# Patient Record
Sex: FEMALE | Race: ASIAN | Employment: STUDENT | URBAN - METROPOLITAN AREA
[De-identification: names, ages, dates, MRNs, and addresses within clinical notes are randomized per-mention and may not be internally consistent; named-entity substitution may affect disease eponyms.]

---
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
## 2020-07-01 ENCOUNTER — OFFICE VISIT (OUTPATIENT)
Dept: FAMILY MEDICINE CLINIC | Facility: CLINIC | Age: 20
End: 2020-07-01

## 2020-07-01 VITALS
WEIGHT: 126 LBS | RESPIRATION RATE: 16 BRPM | BODY MASS INDEX: 20.25 KG/M2 | DIASTOLIC BLOOD PRESSURE: 62 MMHG | TEMPERATURE: 99 F | HEIGHT: 66 IN | HEART RATE: 68 BPM | SYSTOLIC BLOOD PRESSURE: 94 MMHG

## 2020-07-01 DIAGNOSIS — N94.6 DYSMENORRHEA: ICD-10-CM

## 2020-07-01 DIAGNOSIS — Z00.00 LABORATORY EXAMINATION ORDERED AS PART OF A ROUTINE GENERAL MEDICAL EXAMINATION: ICD-10-CM

## 2020-07-01 DIAGNOSIS — Z00.00 ROUTINE GENERAL MEDICAL EXAMINATION AT A HEALTH CARE FACILITY: Primary | ICD-10-CM

## 2020-07-01 DIAGNOSIS — Z13.89 SCREENING FOR GENITOURINARY CONDITION: ICD-10-CM

## 2020-07-01 PROCEDURE — 99395 PREV VISIT EST AGE 18-39: CPT | Performed by: NURSE PRACTITIONER

## 2020-07-01 NOTE — H&P
HPI:   Mary Pike is a 20year old female who presents for a complete physical exam. Symptoms: denies discharge, itching, burning or dysuria, periods are regular, flow is 6-7 days, LMP: 06/13/2020. Patient is not sexually active.  No family history of breast 103 101 - 111 mmol/L    CO2 25.0 22.0 - 32.0 mmol/L    Anion Gap 8 0 - 18 mmol/L    BUN/CREA Ratio 17.0 10.0 - 20.0    Calculated Osmolality 283 275 - 295 mOsm/kg   VITAMIN D, 25-HYDROXY   Result Value Ref Range    Vitamin D, 25OH, Total 17.1 (L) 30.0 - 10 denies leg cramps  GI: denies abdominal pain, bowel movement changes, blood in stool  : denies urinary problems, vaginal discharge or discomfort, periods regular  MUSCULOSKELETAL: denies joint pain or stiffness  NEURO: denies headaches, tingling or dizzi facility  - CBC WITH DIFFERENTIAL WITH PLATELET; Future  - COMP METABOLIC PANEL (14); Future  - LIPID PANEL; Future  - TSH+FREE T4; Future    2. BMI 20.0-20.9, adult  Monitor.     3. Dysmenorrhea  Discussed can try OTC Midol or Ibuprofen 600 mg q 6hours prn

## 2020-07-06 ENCOUNTER — LAB ENCOUNTER (OUTPATIENT)
Dept: LAB | Age: 20
End: 2020-07-06
Attending: NURSE PRACTITIONER
Payer: COMMERCIAL

## 2020-07-06 DIAGNOSIS — Z00.00 LABORATORY EXAMINATION ORDERED AS PART OF A ROUTINE GENERAL MEDICAL EXAMINATION: ICD-10-CM

## 2020-07-06 DIAGNOSIS — Z13.89 SCREENING FOR GENITOURINARY CONDITION: ICD-10-CM

## 2020-07-06 DIAGNOSIS — Z00.00 ROUTINE GENERAL MEDICAL EXAMINATION AT A HEALTH CARE FACILITY: ICD-10-CM

## 2020-07-06 LAB
ALBUMIN SERPL-MCNC: 4.3 G/DL (ref 3.4–5)
ALBUMIN/GLOB SERPL: 1.1 {RATIO} (ref 1–2)
ALP LIVER SERPL-CCNC: 41 U/L (ref 52–144)
ALT SERPL-CCNC: 14 U/L (ref 13–56)
ANION GAP SERPL CALC-SCNC: 6 MMOL/L (ref 0–18)
AST SERPL-CCNC: 16 U/L (ref 15–37)
BASOPHILS # BLD AUTO: 0.14 X10(3) UL (ref 0–0.2)
BASOPHILS NFR BLD AUTO: 1.6 %
BILIRUB SERPL-MCNC: 0.9 MG/DL (ref 0.1–2)
BILIRUB UR QL STRIP.AUTO: NEGATIVE
BUN BLD-MCNC: 14 MG/DL (ref 7–18)
BUN/CREAT SERPL: 16.1 (ref 10–20)
CALCIUM BLD-MCNC: 9.3 MG/DL (ref 8.5–10.1)
CHLORIDE SERPL-SCNC: 106 MMOL/L (ref 98–112)
CHOLEST SMN-MCNC: 181 MG/DL (ref ?–200)
CLARITY UR REFRACT.AUTO: CLEAR
CO2 SERPL-SCNC: 26 MMOL/L (ref 21–32)
COLOR UR AUTO: YELLOW
CREAT BLD-MCNC: 0.87 MG/DL (ref 0.55–1.02)
DEPRECATED RDW RBC AUTO: 40.8 FL (ref 35.1–46.3)
EOSINOPHIL # BLD AUTO: 1.54 X10(3) UL (ref 0–0.7)
EOSINOPHIL NFR BLD AUTO: 17.6 %
ERYTHROCYTE [DISTWIDTH] IN BLOOD BY AUTOMATED COUNT: 11.9 % (ref 11–15)
GLOBULIN PLAS-MCNC: 3.8 G/DL (ref 2.8–4.4)
GLUCOSE BLD-MCNC: 87 MG/DL (ref 70–99)
GLUCOSE UR STRIP.AUTO-MCNC: NEGATIVE MG/DL
HCT VFR BLD AUTO: 41.5 % (ref 35–48)
HDLC SERPL-MCNC: 44 MG/DL (ref 40–59)
HGB BLD-MCNC: 13.4 G/DL (ref 12–16)
IMM GRANULOCYTES # BLD AUTO: 0.02 X10(3) UL (ref 0–1)
IMM GRANULOCYTES NFR BLD: 0.2 %
KETONES UR STRIP.AUTO-MCNC: NEGATIVE MG/DL
LDLC SERPL CALC-MCNC: 114 MG/DL (ref ?–100)
LEUKOCYTE ESTERASE UR QL STRIP.AUTO: NEGATIVE
LYMPHOCYTES # BLD AUTO: 2.54 X10(3) UL (ref 1–4)
LYMPHOCYTES NFR BLD AUTO: 29.1 %
M PROTEIN MFR SERPL ELPH: 8.1 G/DL (ref 6.4–8.2)
MCH RBC QN AUTO: 30.1 PG (ref 26–34)
MCHC RBC AUTO-ENTMCNC: 32.3 G/DL (ref 31–37)
MCV RBC AUTO: 93.3 FL (ref 80–100)
MONOCYTES # BLD AUTO: 0.53 X10(3) UL (ref 0.1–1)
MONOCYTES NFR BLD AUTO: 6.1 %
NEUTROPHILS # BLD AUTO: 3.97 X10 (3) UL (ref 1.5–7.7)
NEUTROPHILS # BLD AUTO: 3.97 X10(3) UL (ref 1.5–7.7)
NEUTROPHILS NFR BLD AUTO: 45.4 %
NITRITE UR QL STRIP.AUTO: NEGATIVE
NONHDLC SERPL-MCNC: 137 MG/DL (ref ?–130)
OSMOLALITY SERPL CALC.SUM OF ELEC: 286 MOSM/KG (ref 275–295)
PATIENT FASTING Y/N/NP: YES
PATIENT FASTING Y/N/NP: YES
PH UR STRIP.AUTO: 7 [PH] (ref 4.5–8)
PLATELET # BLD AUTO: 229 10(3)UL (ref 150–450)
POTASSIUM SERPL-SCNC: 3.8 MMOL/L (ref 3.5–5.1)
PROT UR STRIP.AUTO-MCNC: NEGATIVE MG/DL
RBC # BLD AUTO: 4.45 X10(6)UL (ref 3.8–5.3)
RBC UR QL AUTO: NEGATIVE
SODIUM SERPL-SCNC: 138 MMOL/L (ref 136–145)
SP GR UR STRIP.AUTO: 1.02 (ref 1–1.03)
T4 FREE SERPL-MCNC: 0.9 NG/DL (ref 0.8–1.7)
TRIGL SERPL-MCNC: 116 MG/DL (ref 30–149)
TSI SER-ACNC: 2.48 MIU/ML (ref 0.36–3.74)
UROBILINOGEN UR STRIP.AUTO-MCNC: <2 MG/DL
VLDLC SERPL CALC-MCNC: 23 MG/DL (ref 0–30)
WBC # BLD AUTO: 8.7 X10(3) UL (ref 4–11)

## 2020-07-06 PROCEDURE — 84439 ASSAY OF FREE THYROXINE: CPT

## 2020-07-06 PROCEDURE — 80053 COMPREHEN METABOLIC PANEL: CPT

## 2020-07-06 PROCEDURE — 85025 COMPLETE CBC W/AUTO DIFF WBC: CPT

## 2020-07-06 PROCEDURE — 36415 COLL VENOUS BLD VENIPUNCTURE: CPT

## 2020-07-06 PROCEDURE — 81003 URINALYSIS AUTO W/O SCOPE: CPT

## 2020-07-06 PROCEDURE — 80061 LIPID PANEL: CPT

## 2020-07-06 PROCEDURE — 84443 ASSAY THYROID STIM HORMONE: CPT

## 2020-07-20 ENCOUNTER — TELEPHONE (OUTPATIENT)
Dept: FAMILY MEDICINE CLINIC | Facility: CLINIC | Age: 20
End: 2020-07-20

## 2020-07-20 NOTE — TELEPHONE ENCOUNTER
Pt would like to know if we can have a blood test to know what her blood type is. Please advise. thank you.

## 2020-07-20 NOTE — TELEPHONE ENCOUNTER
I spoke with pt.s mother Guanaco Long. She is on her HIPPA. She was advised to donate blood to find out her blood type if she did not want to pay out of pocket to find out. Mother agreed to plan and verbalized understanding.

## 2020-08-07 ENCOUNTER — LAB ENCOUNTER (OUTPATIENT)
Dept: LAB | Age: 20
End: 2020-08-07
Attending: FAMILY MEDICINE
Payer: COMMERCIAL

## 2020-08-07 DIAGNOSIS — R79.89 ABNORMAL CBC: ICD-10-CM

## 2020-08-07 LAB
BASOPHILS # BLD AUTO: 0.08 X10(3) UL (ref 0–0.2)
BASOPHILS NFR BLD AUTO: 1.2 %
DEPRECATED RDW RBC AUTO: 41.1 FL (ref 35.1–46.3)
EOSINOPHIL # BLD AUTO: 0.49 X10(3) UL (ref 0–0.7)
EOSINOPHIL NFR BLD AUTO: 7.1 %
ERYTHROCYTE [DISTWIDTH] IN BLOOD BY AUTOMATED COUNT: 11.8 % (ref 11–15)
HCT VFR BLD AUTO: 41.2 % (ref 35–48)
HGB BLD-MCNC: 13.4 G/DL (ref 12–16)
IMM GRANULOCYTES # BLD AUTO: 0.01 X10(3) UL (ref 0–1)
IMM GRANULOCYTES NFR BLD: 0.1 %
LYMPHOCYTES # BLD AUTO: 2.05 X10(3) UL (ref 1–4)
LYMPHOCYTES NFR BLD AUTO: 29.5 %
MCH RBC QN AUTO: 30.7 PG (ref 26–34)
MCHC RBC AUTO-ENTMCNC: 32.5 G/DL (ref 31–37)
MCV RBC AUTO: 94.5 FL (ref 80–100)
MONOCYTES # BLD AUTO: 0.42 X10(3) UL (ref 0.1–1)
MONOCYTES NFR BLD AUTO: 6 %
NEUTROPHILS # BLD AUTO: 3.9 X10 (3) UL (ref 1.5–7.7)
NEUTROPHILS # BLD AUTO: 3.9 X10(3) UL (ref 1.5–7.7)
NEUTROPHILS NFR BLD AUTO: 56.1 %
PLATELET # BLD AUTO: 236 10(3)UL (ref 150–450)
RBC # BLD AUTO: 4.36 X10(6)UL (ref 3.8–5.3)
WBC # BLD AUTO: 7 X10(3) UL (ref 4–11)

## 2020-08-07 PROCEDURE — 36415 COLL VENOUS BLD VENIPUNCTURE: CPT

## 2020-08-07 PROCEDURE — 85025 COMPLETE CBC W/AUTO DIFF WBC: CPT

## 2020-10-30 ENCOUNTER — IMMUNIZATION (OUTPATIENT)
Dept: FAMILY MEDICINE CLINIC | Facility: CLINIC | Age: 20
End: 2020-10-30

## 2020-10-30 DIAGNOSIS — Z23 NEED FOR VACCINATION: ICD-10-CM

## 2020-10-30 PROCEDURE — 90686 IIV4 VACC NO PRSV 0.5 ML IM: CPT | Performed by: FAMILY MEDICINE

## 2020-10-30 PROCEDURE — 90471 IMMUNIZATION ADMIN: CPT | Performed by: FAMILY MEDICINE

## 2020-11-06 ENCOUNTER — TELEMEDICINE (OUTPATIENT)
Dept: FAMILY MEDICINE CLINIC | Facility: CLINIC | Age: 20
End: 2020-11-06

## 2020-11-06 ENCOUNTER — TELEPHONE (OUTPATIENT)
Dept: FAMILY MEDICINE CLINIC | Facility: CLINIC | Age: 20
End: 2020-11-06

## 2020-11-06 DIAGNOSIS — B34.9 VIRAL SYNDROME: Primary | ICD-10-CM

## 2020-11-06 PROCEDURE — 99213 OFFICE O/P EST LOW 20 MIN: CPT | Performed by: FAMILY MEDICINE

## 2020-11-06 NOTE — PROGRESS NOTES
Subjective     HPI:   Adrianna Corado verbally consents to a Virtual/Telephone Check-In service on 11/06/20. Patient understands and accepts financial responsibility for any deductible, co-insurance and/or co-pays associated with this service.  This visit is condu

## 2020-11-06 NOTE — TELEPHONE ENCOUNTER
What symptoms is the patient experiencing?:  Sore throat headache    Has the patient had ANY travel within the last 30 days (domestic or international - please list) - IF YES, SEND TO TRIAGE?  no    Has the patient had contact with any person who has confi

## 2020-11-06 NOTE — TELEPHONE ENCOUNTER
About 5 days of H/A and sore throat on and off. Taking nothing. Denies fever,SOB,cough. Denies any other symptoms. 2 people have confirmed +, 2 people are out sick. Works with patient.

## 2020-11-09 ENCOUNTER — APPOINTMENT (OUTPATIENT)
Dept: LAB | Age: 20
End: 2020-11-09
Attending: FAMILY MEDICINE
Payer: COMMERCIAL

## 2020-11-09 DIAGNOSIS — B34.9 VIRAL SYNDROME: ICD-10-CM

## 2020-11-17 ENCOUNTER — TELEPHONE (OUTPATIENT)
Dept: FAMILY MEDICINE CLINIC | Facility: CLINIC | Age: 20
End: 2020-11-17

## 2021-01-04 ENCOUNTER — OFFICE VISIT (OUTPATIENT)
Dept: FAMILY MEDICINE CLINIC | Facility: CLINIC | Age: 21
End: 2021-01-04

## 2021-01-04 VITALS
BODY MASS INDEX: 19.77 KG/M2 | SYSTOLIC BLOOD PRESSURE: 104 MMHG | RESPIRATION RATE: 16 BRPM | TEMPERATURE: 98 F | WEIGHT: 123 LBS | HEIGHT: 66 IN | HEART RATE: 76 BPM | DIASTOLIC BLOOD PRESSURE: 68 MMHG

## 2021-01-04 DIAGNOSIS — N89.8 VAGINAL DISCHARGE: Primary | ICD-10-CM

## 2021-01-04 DIAGNOSIS — Z23 NEED FOR VACCINATION: ICD-10-CM

## 2021-01-04 PROCEDURE — 87660 TRICHOMONAS VAGIN DIR PROBE: CPT | Performed by: NURSE PRACTITIONER

## 2021-01-04 PROCEDURE — 90715 TDAP VACCINE 7 YRS/> IM: CPT | Performed by: NURSE PRACTITIONER

## 2021-01-04 PROCEDURE — 87591 N.GONORRHOEAE DNA AMP PROB: CPT | Performed by: NURSE PRACTITIONER

## 2021-01-04 PROCEDURE — 87491 CHLMYD TRACH DNA AMP PROBE: CPT | Performed by: NURSE PRACTITIONER

## 2021-01-04 PROCEDURE — 3008F BODY MASS INDEX DOCD: CPT | Performed by: NURSE PRACTITIONER

## 2021-01-04 PROCEDURE — 87510 GARDNER VAG DNA DIR PROBE: CPT | Performed by: NURSE PRACTITIONER

## 2021-01-04 PROCEDURE — 99213 OFFICE O/P EST LOW 20 MIN: CPT | Performed by: NURSE PRACTITIONER

## 2021-01-04 PROCEDURE — 3078F DIAST BP <80 MM HG: CPT | Performed by: NURSE PRACTITIONER

## 2021-01-04 PROCEDURE — 87480 CANDIDA DNA DIR PROBE: CPT | Performed by: NURSE PRACTITIONER

## 2021-01-04 PROCEDURE — 90471 IMMUNIZATION ADMIN: CPT | Performed by: NURSE PRACTITIONER

## 2021-01-04 PROCEDURE — 3074F SYST BP LT 130 MM HG: CPT | Performed by: NURSE PRACTITIONER

## 2021-01-04 NOTE — PROGRESS NOTES
Feliberto Zapata is a 23year old female. HPI:   Patient presents today reporting a 3 week history of dark yellow vaginal discharge, had an odor to it but not pungent, she did have some vaginal itching the first 1.5 weeks-but this has since subsided.  Started afte introitus and vagina. Small amount of white discharge noted to cervix. Small amount of brown discharge in vagina. No cervical motion tenderness. No adnexal tenderness. Gonorrhea/chlamydia/vaginitis panel completed.   EXTREMITIES: no cyanosis, clubbing or ed

## 2021-01-05 LAB
C TRACH DNA SPEC QL NAA+PROBE: NEGATIVE
N GONORRHOEA DNA SPEC QL NAA+PROBE: NEGATIVE

## 2021-08-27 ENCOUNTER — OFFICE VISIT (OUTPATIENT)
Dept: FAMILY MEDICINE CLINIC | Facility: CLINIC | Age: 21
End: 2021-08-27

## 2021-08-27 ENCOUNTER — LAB ENCOUNTER (OUTPATIENT)
Dept: LAB | Age: 21
End: 2021-08-27
Attending: NURSE PRACTITIONER
Payer: COMMERCIAL

## 2021-08-27 VITALS
HEART RATE: 68 BPM | SYSTOLIC BLOOD PRESSURE: 102 MMHG | BODY MASS INDEX: 18.4 KG/M2 | WEIGHT: 117.25 LBS | RESPIRATION RATE: 16 BRPM | HEIGHT: 66.75 IN | DIASTOLIC BLOOD PRESSURE: 66 MMHG | TEMPERATURE: 98 F

## 2021-08-27 DIAGNOSIS — F41.9 ANXIETY: ICD-10-CM

## 2021-08-27 DIAGNOSIS — Z00.00 LABORATORY EXAMINATION ORDERED AS PART OF A ROUTINE GENERAL MEDICAL EXAMINATION: ICD-10-CM

## 2021-08-27 DIAGNOSIS — Z00.00 ANNUAL PHYSICAL EXAM: ICD-10-CM

## 2021-08-27 DIAGNOSIS — L81.9 DISCOLORATION OF SKIN: ICD-10-CM

## 2021-08-27 DIAGNOSIS — N94.3 PMS (PREMENSTRUAL SYNDROME): ICD-10-CM

## 2021-08-27 DIAGNOSIS — E55.9 VITAMIN D DEFICIENCY: ICD-10-CM

## 2021-08-27 DIAGNOSIS — Z00.00 ANNUAL PHYSICAL EXAM: Primary | ICD-10-CM

## 2021-08-27 DIAGNOSIS — Z13.89 SCREENING FOR GENITOURINARY CONDITION: ICD-10-CM

## 2021-08-27 LAB
ALBUMIN SERPL-MCNC: 4.6 G/DL (ref 3.4–5)
ALBUMIN/GLOB SERPL: 1.4 {RATIO} (ref 1–2)
ALP LIVER SERPL-CCNC: 42 U/L
ALT SERPL-CCNC: 20 U/L
ANION GAP SERPL CALC-SCNC: 6 MMOL/L (ref 0–18)
AST SERPL-CCNC: 14 U/L (ref 15–37)
BASOPHILS # BLD AUTO: 0.09 X10(3) UL (ref 0–0.2)
BASOPHILS NFR BLD AUTO: 1.9 %
BILIRUB SERPL-MCNC: 0.9 MG/DL (ref 0.1–2)
BILIRUB UR QL STRIP.AUTO: NEGATIVE
BUN BLD-MCNC: 16 MG/DL (ref 7–18)
CALCIUM BLD-MCNC: 9.2 MG/DL (ref 8.5–10.1)
CHLORIDE SERPL-SCNC: 104 MMOL/L (ref 98–112)
CHOLEST SMN-MCNC: 176 MG/DL (ref ?–200)
CLARITY UR REFRACT.AUTO: CLEAR
CO2 SERPL-SCNC: 27 MMOL/L (ref 21–32)
COLOR UR AUTO: YELLOW
CREAT BLD-MCNC: 0.82 MG/DL
EOSINOPHIL # BLD AUTO: 0.23 X10(3) UL (ref 0–0.7)
EOSINOPHIL NFR BLD AUTO: 5 %
ERYTHROCYTE [DISTWIDTH] IN BLOOD BY AUTOMATED COUNT: 11.4 %
GLOBULIN PLAS-MCNC: 3.4 G/DL (ref 2.8–4.4)
GLUCOSE BLD-MCNC: 88 MG/DL (ref 70–99)
GLUCOSE UR STRIP.AUTO-MCNC: NEGATIVE MG/DL
HCT VFR BLD AUTO: 41 %
HDLC SERPL-MCNC: 58 MG/DL (ref 40–59)
HGB BLD-MCNC: 13.2 G/DL
IMM GRANULOCYTES # BLD AUTO: 0.01 X10(3) UL (ref 0–1)
IMM GRANULOCYTES NFR BLD: 0.2 %
KETONES UR STRIP.AUTO-MCNC: NEGATIVE MG/DL
LDLC SERPL CALC-MCNC: 106 MG/DL (ref ?–100)
LEUKOCYTE ESTERASE UR QL STRIP.AUTO: NEGATIVE
LYMPHOCYTES # BLD AUTO: 1.34 X10(3) UL (ref 1–4)
LYMPHOCYTES NFR BLD AUTO: 28.9 %
M PROTEIN MFR SERPL ELPH: 8 G/DL (ref 6.4–8.2)
MCH RBC QN AUTO: 30.6 PG (ref 26–34)
MCHC RBC AUTO-ENTMCNC: 32.2 G/DL (ref 31–37)
MCV RBC AUTO: 95.1 FL
MONOCYTES # BLD AUTO: 0.31 X10(3) UL (ref 0.1–1)
MONOCYTES NFR BLD AUTO: 6.7 %
NEUTROPHILS # BLD AUTO: 2.66 X10 (3) UL (ref 1.5–7.7)
NEUTROPHILS # BLD AUTO: 2.66 X10(3) UL (ref 1.5–7.7)
NEUTROPHILS NFR BLD AUTO: 57.3 %
NITRITE UR QL STRIP.AUTO: NEGATIVE
NONHDLC SERPL-MCNC: 118 MG/DL (ref ?–130)
OSMOLALITY SERPL CALC.SUM OF ELEC: 285 MOSM/KG (ref 275–295)
PATIENT FASTING Y/N/NP: YES
PATIENT FASTING Y/N/NP: YES
PH UR STRIP.AUTO: 7 [PH] (ref 5–8)
PLATELET # BLD AUTO: 220 10(3)UL (ref 150–450)
POTASSIUM SERPL-SCNC: 3.9 MMOL/L (ref 3.5–5.1)
PROT UR STRIP.AUTO-MCNC: NEGATIVE MG/DL
RBC # BLD AUTO: 4.31 X10(6)UL
RBC UR QL AUTO: NEGATIVE
SODIUM SERPL-SCNC: 137 MMOL/L (ref 136–145)
SP GR UR STRIP.AUTO: 1.02 (ref 1–1.03)
T4 FREE SERPL-MCNC: 1 NG/DL (ref 0.8–1.7)
TRIGL SERPL-MCNC: 61 MG/DL (ref 30–149)
TSI SER-ACNC: 1.15 MIU/ML (ref 0.36–3.74)
UROBILINOGEN UR STRIP.AUTO-MCNC: <2 MG/DL
VIT D+METAB SERPL-MCNC: 29.2 NG/ML (ref 30–100)
VLDLC SERPL CALC-MCNC: 10 MG/DL (ref 0–30)
WBC # BLD AUTO: 4.6 X10(3) UL (ref 4–11)

## 2021-08-27 PROCEDURE — 99395 PREV VISIT EST AGE 18-39: CPT | Performed by: NURSE PRACTITIONER

## 2021-08-27 PROCEDURE — 82306 VITAMIN D 25 HYDROXY: CPT

## 2021-08-27 PROCEDURE — 99213 OFFICE O/P EST LOW 20 MIN: CPT | Performed by: NURSE PRACTITIONER

## 2021-08-27 PROCEDURE — 85025 COMPLETE CBC W/AUTO DIFF WBC: CPT

## 2021-08-27 PROCEDURE — 3008F BODY MASS INDEX DOCD: CPT | Performed by: NURSE PRACTITIONER

## 2021-08-27 PROCEDURE — 3078F DIAST BP <80 MM HG: CPT | Performed by: NURSE PRACTITIONER

## 2021-08-27 PROCEDURE — 80061 LIPID PANEL: CPT

## 2021-08-27 PROCEDURE — 81003 URINALYSIS AUTO W/O SCOPE: CPT

## 2021-08-27 PROCEDURE — 84443 ASSAY THYROID STIM HORMONE: CPT

## 2021-08-27 PROCEDURE — 3074F SYST BP LT 130 MM HG: CPT | Performed by: NURSE PRACTITIONER

## 2021-08-27 PROCEDURE — 80053 COMPREHEN METABOLIC PANEL: CPT

## 2021-08-27 PROCEDURE — 36415 COLL VENOUS BLD VENIPUNCTURE: CPT

## 2021-08-27 PROCEDURE — 84439 ASSAY OF FREE THYROXINE: CPT

## 2021-08-27 NOTE — H&P
HPI:   Nerissa Weiner is a 23year old female who presents for a complete physical exam. Symptoms: denies discharge, itching, burning or dysuria, periods are regular, flow is 6-7 days, LMP: 08/14/2021. Abnormal pap- has never had pap smear.  Discussed guidelines Ref Range    Trichomonas vaginitis result Negative For Trichomonas Negative    Gardnerella vaginitis result  Negative     Negative For Gardnerella, Bacterial Vaginosis Not Likely    Candida vaginosis result Negative For Candida Negative   CHLAMYDIA/GONOCOC kg/m².   GENERAL: well developed, well nourished and in no apparent distress  SKIN: no rashes,no suspicious lesions. Small area of faint hyperpigmentation to left forearm.    HEENT: atraumatic, normocephalic,ears, nose and throat are normal  EYES: ABDI HAWTHORNE examination  - CBC WITH DIFFERENTIAL WITH PLATELET; Future  - COMP METABOLIC PANEL (14); Future  - TSH+FREE T4; Future  - LIPID PANEL; Future    8.  Screening for genitourinary condition  - URINALYSIS WITH CULTURE REFLEX; Future      PHQ-9, JULIO CESAR-7 completed

## 2021-10-01 NOTE — PROGRESS NOTES
Virtual/Telephone Check-In    Gato Garcia verbally consents to a Virtual/Telephone Check-In service on 10/01/21. Patient understands and accepts financial responsibility for any deductible, co-insurance and/or co-pays associated with this service.  This visit medication at this time. Pt to call office if any increased anxiety. To VILMA/ER if any thoughts of harming self/others. Pt voiced understanding. Follow up: if any new/worsening symptoms- pt to call office.   Duration of service, in minutes: 15 minutes

## 2021-11-20 ENCOUNTER — IMMUNIZATION (OUTPATIENT)
Dept: FAMILY MEDICINE CLINIC | Facility: CLINIC | Age: 21
End: 2021-11-20

## 2021-11-20 PROCEDURE — 90686 IIV4 VACC NO PRSV 0.5 ML IM: CPT | Performed by: NURSE PRACTITIONER

## 2021-11-20 PROCEDURE — 90471 IMMUNIZATION ADMIN: CPT | Performed by: NURSE PRACTITIONER

## 2022-08-29 ENCOUNTER — OFFICE VISIT (OUTPATIENT)
Dept: FAMILY MEDICINE CLINIC | Facility: CLINIC | Age: 22
End: 2022-08-29
Payer: COMMERCIAL

## 2022-08-29 ENCOUNTER — LAB ENCOUNTER (OUTPATIENT)
Dept: LAB | Age: 22
End: 2022-08-29
Attending: NURSE PRACTITIONER
Payer: COMMERCIAL

## 2022-08-29 VITALS
WEIGHT: 124 LBS | HEART RATE: 56 BPM | HEIGHT: 67 IN | TEMPERATURE: 97 F | RESPIRATION RATE: 16 BRPM | SYSTOLIC BLOOD PRESSURE: 90 MMHG | BODY MASS INDEX: 19.46 KG/M2 | DIASTOLIC BLOOD PRESSURE: 60 MMHG

## 2022-08-29 DIAGNOSIS — E55.9 VITAMIN D DEFICIENCY: ICD-10-CM

## 2022-08-29 DIAGNOSIS — Z00.00 LABORATORY EXAMINATION ORDERED AS PART OF A ROUTINE GENERAL MEDICAL EXAMINATION: ICD-10-CM

## 2022-08-29 DIAGNOSIS — H69.83 DYSFUNCTION OF BOTH EUSTACHIAN TUBES: ICD-10-CM

## 2022-08-29 DIAGNOSIS — Z00.00 ANNUAL PHYSICAL EXAM: ICD-10-CM

## 2022-08-29 DIAGNOSIS — Z00.00 ANNUAL PHYSICAL EXAM: Primary | ICD-10-CM

## 2022-08-29 DIAGNOSIS — H93.93 PROBLEM OF BOTH EARS: ICD-10-CM

## 2022-08-29 LAB
ALBUMIN SERPL-MCNC: 4.4 G/DL (ref 3.4–5)
ALBUMIN/GLOB SERPL: 1.3 {RATIO} (ref 1–2)
ALP LIVER SERPL-CCNC: 50 U/L
ALT SERPL-CCNC: 18 U/L
ANION GAP SERPL CALC-SCNC: 6 MMOL/L (ref 0–18)
AST SERPL-CCNC: 15 U/L (ref 15–37)
BASOPHILS # BLD AUTO: 0.1 X10(3) UL (ref 0–0.2)
BASOPHILS NFR BLD AUTO: 1.9 %
BILIRUB SERPL-MCNC: 1 MG/DL (ref 0.1–2)
BUN BLD-MCNC: 21 MG/DL (ref 7–18)
BUN/CREAT SERPL: 24.4 (ref 10–20)
CALCIUM BLD-MCNC: 9.5 MG/DL (ref 8.5–10.1)
CHLORIDE SERPL-SCNC: 107 MMOL/L (ref 98–112)
CHOLEST SERPL-MCNC: 201 MG/DL (ref ?–200)
CO2 SERPL-SCNC: 27 MMOL/L (ref 21–32)
CREAT BLD-MCNC: 0.86 MG/DL
DEPRECATED RDW RBC AUTO: 38.5 FL (ref 35.1–46.3)
EOSINOPHIL # BLD AUTO: 0.25 X10(3) UL (ref 0–0.7)
EOSINOPHIL NFR BLD AUTO: 4.9 %
ERYTHROCYTE [DISTWIDTH] IN BLOOD BY AUTOMATED COUNT: 11.3 % (ref 11–15)
FASTING PATIENT LIPID ANSWER: YES
FASTING STATUS PATIENT QL REPORTED: YES
GFR SERPLBLD BASED ON 1.73 SQ M-ARVRAT: 98 ML/MIN/1.73M2 (ref 60–?)
GLOBULIN PLAS-MCNC: 3.5 G/DL (ref 2.8–4.4)
GLUCOSE BLD-MCNC: 93 MG/DL (ref 70–99)
HCT VFR BLD AUTO: 42.3 %
HDLC SERPL-MCNC: 59 MG/DL (ref 40–59)
HGB BLD-MCNC: 14 G/DL
IMM GRANULOCYTES # BLD AUTO: 0.01 X10(3) UL (ref 0–1)
IMM GRANULOCYTES NFR BLD: 0.2 %
LDLC SERPL CALC-MCNC: 127 MG/DL (ref ?–100)
LYMPHOCYTES # BLD AUTO: 1.53 X10(3) UL (ref 1–4)
LYMPHOCYTES NFR BLD AUTO: 29.8 %
MCH RBC QN AUTO: 31.2 PG (ref 26–34)
MCHC RBC AUTO-ENTMCNC: 33.1 G/DL (ref 31–37)
MCV RBC AUTO: 94.2 FL
MONOCYTES # BLD AUTO: 0.4 X10(3) UL (ref 0.1–1)
MONOCYTES NFR BLD AUTO: 7.8 %
NEUTROPHILS # BLD AUTO: 2.85 X10 (3) UL (ref 1.5–7.7)
NEUTROPHILS # BLD AUTO: 2.85 X10(3) UL (ref 1.5–7.7)
NEUTROPHILS NFR BLD AUTO: 55.4 %
NONHDLC SERPL-MCNC: 142 MG/DL (ref ?–130)
OSMOLALITY SERPL CALC.SUM OF ELEC: 293 MOSM/KG (ref 275–295)
PLATELET # BLD AUTO: 219 10(3)UL (ref 150–450)
POTASSIUM SERPL-SCNC: 4.5 MMOL/L (ref 3.5–5.1)
PROT SERPL-MCNC: 7.9 G/DL (ref 6.4–8.2)
RBC # BLD AUTO: 4.49 X10(6)UL
SODIUM SERPL-SCNC: 140 MMOL/L (ref 136–145)
T4 FREE SERPL-MCNC: 1 NG/DL (ref 0.8–1.7)
TRIGL SERPL-MCNC: 86 MG/DL (ref 30–149)
TSI SER-ACNC: 1.37 MIU/ML (ref 0.36–3.74)
VIT D+METAB SERPL-MCNC: 41.1 NG/ML (ref 30–100)
VLDLC SERPL CALC-MCNC: 15 MG/DL (ref 0–30)
WBC # BLD AUTO: 5.1 X10(3) UL (ref 4–11)

## 2022-08-29 PROCEDURE — 84443 ASSAY THYROID STIM HORMONE: CPT

## 2022-08-29 PROCEDURE — 99395 PREV VISIT EST AGE 18-39: CPT | Performed by: NURSE PRACTITIONER

## 2022-08-29 PROCEDURE — 80061 LIPID PANEL: CPT

## 2022-08-29 PROCEDURE — 3078F DIAST BP <80 MM HG: CPT | Performed by: NURSE PRACTITIONER

## 2022-08-29 PROCEDURE — 85025 COMPLETE CBC W/AUTO DIFF WBC: CPT

## 2022-08-29 PROCEDURE — 84439 ASSAY OF FREE THYROXINE: CPT

## 2022-08-29 PROCEDURE — 82306 VITAMIN D 25 HYDROXY: CPT

## 2022-08-29 PROCEDURE — 80053 COMPREHEN METABOLIC PANEL: CPT

## 2022-08-29 PROCEDURE — 3008F BODY MASS INDEX DOCD: CPT | Performed by: NURSE PRACTITIONER

## 2022-08-29 PROCEDURE — 3074F SYST BP LT 130 MM HG: CPT | Performed by: NURSE PRACTITIONER

## 2022-12-12 ENCOUNTER — HOSPITAL ENCOUNTER (OUTPATIENT)
Age: 22
Discharge: HOME | End: 2022-12-12
Payer: COMMERCIAL

## 2022-12-12 VITALS — TEMPERATURE: 99 F

## 2022-12-12 PROCEDURE — 90471 IMMUNIZATION ADMIN: CPT

## 2022-12-19 ENCOUNTER — NURSE ONLY (OUTPATIENT)
Dept: FAMILY MEDICINE CLINIC | Facility: CLINIC | Age: 22
End: 2022-12-19
Payer: COMMERCIAL

## 2022-12-19 PROCEDURE — 86580 TB INTRADERMAL TEST: CPT | Performed by: NURSE PRACTITIONER

## 2022-12-19 NOTE — PROGRESS NOTES
Pt arrived for TB injection. It was given in her left forearm. Pt TB read is scheduled for Wednesday. She will need a copy of her immunization list on Wednesday.

## 2022-12-21 ENCOUNTER — NURSE ONLY (OUTPATIENT)
Dept: FAMILY MEDICINE CLINIC | Facility: CLINIC | Age: 22
End: 2022-12-21
Payer: COMMERCIAL

## 2022-12-28 ENCOUNTER — TELEPHONE (OUTPATIENT)
Dept: FAMILY MEDICINE CLINIC | Facility: CLINIC | Age: 22
End: 2022-12-28

## 2022-12-28 NOTE — TELEPHONE ENCOUNTER
Pt requesting order for TB test.    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 Asked if pt meant '2 step TB test' - pt states \"no I don't think so, just 2 negative TB tests\". Please place order if appropriate? Pt requested to schedule prior to order being placed as she needs apt ASAP. Pt returning to school next week, needs tests prior.

## 2022-12-28 NOTE — TELEPHONE ENCOUNTER
Record shows pt is scheduled for [facility] 12/29/22 300 pm for TB test.  Initial call info sts pt \"doesn't think\" she needs 2 step TB test but needs TB test x2    Call to pt's cell reaches identified voice mail. Requested call back to any triage nurse as soon as our phones open at 830 am tomorrow as we need more specific info re type of TB test hosp is requiring. Will then need to obtain order from [facility].  Provided ofc #

## 2022-12-29 ENCOUNTER — NURSE ONLY (OUTPATIENT)
Dept: FAMILY MEDICINE CLINIC | Facility: CLINIC | Age: 22
End: 2022-12-29
Payer: COMMERCIAL

## 2022-12-29 DIAGNOSIS — Z11.1 SCREENING FOR TUBERCULOSIS: Primary | ICD-10-CM

## 2022-12-29 PROCEDURE — 86580 TB INTRADERMAL TEST: CPT | Performed by: NURSE PRACTITIONER

## 2022-12-29 NOTE — TELEPHONE ENCOUNTER
S/w who clarified she does need 2 step TB test [place] [facility]  She had her 1st TB test 7 days ago on 12/21/22 and scheduled today for the 2nd step test.

## 2022-12-29 NOTE — PROGRESS NOTES
Pt here for TB test.    She had previous TB placed 12/19/22 and was read as negative for induration 12/21/22  Today pt called and confirmed she needed a T step TB test.  I assessed her Left forearm from original test and confirmed it is still negative (*with 2 step testing, one week after is when it should have been read)    #2 TB test administered. Pt tolerated well. Pt will proceed to [facility] in 48-72hrs for TB read as our office will be closed. Pt advised of this and is in agreement. Reports she will need copies of both results.

## 2022-12-29 NOTE — TELEPHONE ENCOUNTER
This should not have been scheduled on a Thursday---we are not here Sat/Sun to read her TB placement. I checked with [facility] here. They advised me they are able to do TB reads. Tc to pt. I let her know to keep her appt today with nurse vs but advised the read will have to be through [facility] Sat/Sun. She voiced understanding. Denies needing any forms, just copies of results printed once read.

## 2022-12-31 ENCOUNTER — OFFICE VISIT (OUTPATIENT)
Dept: FAMILY MEDICINE CLINIC | Facility: CLINIC | Age: 22
End: 2022-12-31
Payer: COMMERCIAL

## 2022-12-31 DIAGNOSIS — Z11.1 ENCOUNTER FOR PPD SKIN TEST READING: Primary | ICD-10-CM

## 2022-12-31 LAB — INDURATION (): 0 MM (ref 0–11)

## 2022-12-31 NOTE — PROGRESS NOTES
Results for orders placed or performed in visit on 12/29/22   TB INTRADERMAL TEST    Collection Time: 12/31/22  3:06 PM   Result Value Ref Range    Date Given: 12/29/22     Site: Right Arm     INDURATION (PPD) 0.0 0.0 - 11 mm       PPD negative

## 2022-12-31 NOTE — PROGRESS NOTES
Pt presents in clinic today for a PPD read. PPD placed on R forearm on 12/29/22. Results: Negative 0.0. Per last office visit, pt requested results be printed. Results printed and given to pt. Pt confirmed nothing further needed.

## 2024-07-16 ENCOUNTER — LAB ENCOUNTER (OUTPATIENT)
Dept: LAB | Age: 24
End: 2024-07-16
Attending: NURSE PRACTITIONER
Payer: COMMERCIAL

## 2024-07-16 ENCOUNTER — OFFICE VISIT (OUTPATIENT)
Dept: FAMILY MEDICINE CLINIC | Facility: CLINIC | Age: 24
End: 2024-07-16
Payer: COMMERCIAL

## 2024-07-16 VITALS
DIASTOLIC BLOOD PRESSURE: 68 MMHG | RESPIRATION RATE: 16 BRPM | SYSTOLIC BLOOD PRESSURE: 108 MMHG | HEART RATE: 60 BPM | BODY MASS INDEX: 19.25 KG/M2 | TEMPERATURE: 98 F | WEIGHT: 122.63 LBS | HEIGHT: 67 IN

## 2024-07-16 DIAGNOSIS — R73.01 ELEVATED FASTING GLUCOSE: ICD-10-CM

## 2024-07-16 DIAGNOSIS — Z00.00 ANNUAL PHYSICAL EXAM: Primary | ICD-10-CM

## 2024-07-16 DIAGNOSIS — Z00.00 LABORATORY EXAMINATION ORDERED AS PART OF A ROUTINE GENERAL MEDICAL EXAMINATION: ICD-10-CM

## 2024-07-16 DIAGNOSIS — Z13.89 SCREENING FOR GENITOURINARY CONDITION: ICD-10-CM

## 2024-07-16 DIAGNOSIS — Z12.4 SCREENING FOR CERVICAL CANCER: ICD-10-CM

## 2024-07-16 LAB
ALBUMIN SERPL-MCNC: 4.9 G/DL (ref 3.2–4.8)
ALBUMIN/GLOB SERPL: 1.6 {RATIO} (ref 1–2)
ALP LIVER SERPL-CCNC: 41 U/L
ALT SERPL-CCNC: 22 U/L
ANION GAP SERPL CALC-SCNC: 3 MMOL/L (ref 0–18)
AST SERPL-CCNC: 24 U/L (ref ?–34)
BASOPHILS # BLD AUTO: 0.12 X10(3) UL (ref 0–0.2)
BASOPHILS NFR BLD AUTO: 1.5 %
BILIRUB SERPL-MCNC: 1.7 MG/DL (ref 0.3–1.2)
BILIRUB UR QL STRIP.AUTO: NEGATIVE
BUN BLD-MCNC: 13 MG/DL (ref 9–23)
CALCIUM BLD-MCNC: 9.9 MG/DL (ref 8.7–10.4)
CHLORIDE SERPL-SCNC: 103 MMOL/L (ref 98–112)
CHOLEST SERPL-MCNC: 216 MG/DL (ref ?–200)
CO2 SERPL-SCNC: 29 MMOL/L (ref 21–32)
COLOR UR AUTO: YELLOW
CREAT BLD-MCNC: 0.87 MG/DL
EGFRCR SERPLBLD CKD-EPI 2021: 95 ML/MIN/1.73M2 (ref 60–?)
EOSINOPHIL # BLD AUTO: 0.26 X10(3) UL (ref 0–0.7)
EOSINOPHIL NFR BLD AUTO: 3.3 %
ERYTHROCYTE [DISTWIDTH] IN BLOOD BY AUTOMATED COUNT: 11.1 %
FASTING PATIENT LIPID ANSWER: YES
FASTING STATUS PATIENT QL REPORTED: YES
GLOBULIN PLAS-MCNC: 3.1 G/DL (ref 2.8–4.4)
GLUCOSE BLD-MCNC: 106 MG/DL (ref 70–99)
GLUCOSE UR STRIP.AUTO-MCNC: NORMAL MG/DL
HCT VFR BLD AUTO: 41.1 %
HDLC SERPL-MCNC: 64 MG/DL (ref 40–59)
HGB BLD-MCNC: 13.9 G/DL
IMM GRANULOCYTES # BLD AUTO: 0.01 X10(3) UL (ref 0–1)
IMM GRANULOCYTES NFR BLD: 0.1 %
KETONES UR STRIP.AUTO-MCNC: NEGATIVE MG/DL
LDLC SERPL CALC-MCNC: 141 MG/DL (ref ?–100)
LEUKOCYTE ESTERASE UR QL STRIP.AUTO: NEGATIVE
LYMPHOCYTES # BLD AUTO: 1.79 X10(3) UL (ref 1–4)
LYMPHOCYTES NFR BLD AUTO: 23 %
MCH RBC QN AUTO: 31.4 PG (ref 26–34)
MCHC RBC AUTO-ENTMCNC: 33.8 G/DL (ref 31–37)
MCV RBC AUTO: 93 FL
MONOCYTES # BLD AUTO: 0.56 X10(3) UL (ref 0.1–1)
MONOCYTES NFR BLD AUTO: 7.2 %
NEUTROPHILS # BLD AUTO: 5.04 X10 (3) UL (ref 1.5–7.7)
NEUTROPHILS # BLD AUTO: 5.04 X10(3) UL (ref 1.5–7.7)
NEUTROPHILS NFR BLD AUTO: 64.9 %
NITRITE UR QL STRIP.AUTO: NEGATIVE
NONHDLC SERPL-MCNC: 152 MG/DL (ref ?–130)
OSMOLALITY SERPL CALC.SUM OF ELEC: 281 MOSM/KG (ref 275–295)
PH UR STRIP.AUTO: 6 [PH] (ref 5–8)
PLATELET # BLD AUTO: 233 10(3)UL (ref 150–450)
POTASSIUM SERPL-SCNC: 4 MMOL/L (ref 3.5–5.1)
PROT SERPL-MCNC: 8 G/DL (ref 5.7–8.2)
PROT UR STRIP.AUTO-MCNC: 70 MG/DL
RBC # BLD AUTO: 4.42 X10(6)UL
RBC UR QL AUTO: NEGATIVE
SODIUM SERPL-SCNC: 135 MMOL/L (ref 136–145)
SP GR UR STRIP.AUTO: 1.03 (ref 1–1.03)
TRIGL SERPL-MCNC: 65 MG/DL (ref 30–149)
TSI SER-ACNC: 1.24 MIU/ML (ref 0.55–4.78)
UROBILINOGEN UR STRIP.AUTO-MCNC: NORMAL MG/DL
VLDLC SERPL CALC-MCNC: 12 MG/DL (ref 0–30)
WBC # BLD AUTO: 7.8 X10(3) UL (ref 4–11)

## 2024-07-16 PROCEDURE — 81001 URINALYSIS AUTO W/SCOPE: CPT

## 2024-07-16 PROCEDURE — 88175 CYTOPATH C/V AUTO FLUID REDO: CPT | Performed by: NURSE PRACTITIONER

## 2024-07-16 PROCEDURE — 99395 PREV VISIT EST AGE 18-39: CPT | Performed by: NURSE PRACTITIONER

## 2024-07-16 PROCEDURE — 87624 HPV HI-RISK TYP POOLED RSLT: CPT | Performed by: NURSE PRACTITIONER

## 2024-07-16 PROCEDURE — 84443 ASSAY THYROID STIM HORMONE: CPT

## 2024-07-16 PROCEDURE — 83036 HEMOGLOBIN GLYCOSYLATED A1C: CPT

## 2024-07-16 PROCEDURE — 85025 COMPLETE CBC W/AUTO DIFF WBC: CPT

## 2024-07-16 PROCEDURE — 80053 COMPREHEN METABOLIC PANEL: CPT

## 2024-07-16 PROCEDURE — 80061 LIPID PANEL: CPT

## 2024-07-16 NOTE — H&P
HPI:   Sara Medina is a 23 year old female who presents for a complete physical exam. Symptoms: denies discharge, itching, burning or dysuria, periods are regular, flow is 5-6 days, LMP: 07/04/2024 .  Has not had pap previously. Not currently sexually active.  No family history of breast or colon cancer  Immunizations needed: Tdap- 2021  Studying for [employer]- taking in Sept. 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    Wt Readings from Last 6 Encounters:   07/16/24 122 lb 9.6 oz (55.6 kg)   08/29/22 124 lb (56.2 kg)   08/27/21 117 lb 4 oz (53.2 kg)   01/04/21 123 lb (55.8 kg)   07/01/20 126 lb (57.2 kg)   07/24/18 127 lb (57.6 kg) (53%, Z= 0.07)*     * Growth percentiles are based on [facility] (Girls, 2-20 Years) data.     Body mass index is 19.2 kg/m².       Results for orders placed or performed in visit on 12/29/22   TB /PPD intradermal test   Result Value Ref Range    Date Given: 12/29/22     Site: Right Arm     INDURATION (PPD) 0.0 0.0 - 11 mm        No current outpatient medications on file.      History reviewed. No pertinent past medical history.   History reviewed. No pertinent surgical history.   History reviewed. No pertinent family history.   Social History:   Social History     Socioeconomic History    Marital status: Single   Tobacco Use    Smoking status: Never    Smokeless tobacco: Never   Substance and Sexual Activity    Alcohol use: Never   Other Topics Concern    Caffeine Concern No    Exercise No     Occ: Research/studying for MCAT.  : N.  Children: N.   Exercise: minimal Diet: monitors diet     REVIEW OF SYSTEMS:   GENERAL: denies fevers, weakness, trouble sleeping or weight changes  SKIN: denies any unusual skin lesions or rashes  EYES:denies vision changes  HEENT: denies upper respiratory symptoms  LUNGS: denies cough or shortness of breath with exertion  CHEST:  denies breast changes or pain  CARDIOVASCULAR: denies chest pain or tightness  on exertion: no edema  VASCULAR: denies leg cramps  GI: denies abdominal pain, bowel movement changes, blood in stool  : denies urinary problems, vaginal discharge or discomfort,  periods regular  MUSCULOSKELETAL: denies joint pain or stiffness  NEURO: denies headaches, tingling or dizziness  PSYCHE: denies depression or anxiety  HEMATOLOGIC: denies bleeding abnormalities  ENDOCRINE: denies temperature intolerance, polyuria, or excessive sweating.  LYMPHATICS: denies swollen glands  EXAM:   /68   Pulse 60   Temp 97.8 °F (36.6 °C) (Temporal)   Resp 16   Ht 5' 7\" (1.702 m)   Wt 122 lb 9.6 oz (55.6 kg)   LMP 07/04/2024 (Exact Date)   BMI 19.20 kg/m²   Body mass index is 19.2 kg/m².   GENERAL: well developed, well nourished and in no apparent distress  SKIN: no rashes  HEENT: atraumatic, normocephalic,ears, nose and throat are normal  EYES: PERRLA, EOMI, sclera, conjunctiva are clear  NECK: supple,no adenopathy  BREAST: symmetrical, no suspicious mass, no nipple dimpling or discharge.  LUNGS: clear to auscultation bilateral, no rales, rhonchi or wheezing  CARDIO: RRR without murmur normal S1S2  ABD:  normal bowel sounds,soft, non tender, no masses, HSM or tenderness  :external labia, introitus and vagina are normal, normal discharge and normal cervix.  Bimanual exam normal no adnexal masses or cervical motion tenderness, PAP was done  MUSCULOSKELETAL: gait amy,l no gross M/S defect.  EXTREMITIES: no clubbing, cyanosis, or edema  NEURO: oriented times three, cranial nerves are grossly intact, no gross motor or sensory deficit.    ASSESSMENT AND PLAN:   Sara Medina is a 23 year old female who presents for a complete physical exam.    Pap and pelvic- completed.    Self breast exam explained. Health maintenance guidance given including vision and dental exams discussed.  Lifestyle guidance provided recommended low fat diet and aerobic exercise 30 minutes 3-4 times weekly. Condoms if applicable.  The patient  indicates understanding of these issues and agrees to the plan.  The patient is asked to return for complete physical yearly.    There are no Patient Instructions on file for this visit.  No follow-ups on file.    1. Annual physical exam    2. Body mass index (BMI) of 19.0 to 19.9 in adult    3. Laboratory examination ordered as part of a routine general medical examination  - CBC With Differential With Platelet; Future  - Comp Metabolic Panel (14); Future  - Lipid Panel; Future  - TSH W Reflex To Free T4; Future    4. Screening for genitourinary condition  - Urinalysis, Routine; Future    5. Screening for cervical cancer  - ThinPrep PAP with HPV Reflex Request B; Future  - ThinPrep PAP with HPV Reflex Request B    Pap/pelvic completed today.  Fasting labs as ordered.  Continue to focus on healthy eating, routine exercise. Body mass index is 19.2 kg/m².  Immunizations- up to date.

## 2024-07-17 LAB
EST. AVERAGE GLUCOSE BLD GHB EST-MCNC: 105 MG/DL (ref 68–126)
HBA1C MFR BLD: 5.3 % (ref ?–5.7)

## 2024-07-18 DIAGNOSIS — R17 ELEVATED BILIRUBIN: ICD-10-CM

## 2024-07-18 DIAGNOSIS — E87.1 HYPONATREMIA: Primary | ICD-10-CM

## 2024-07-22 LAB
.: NORMAL
.: NORMAL

## 2024-07-23 LAB — HPV E6+E7 MRNA CVX QL NAA+PROBE: NEGATIVE

## 2024-11-22 ENCOUNTER — APPOINTMENT (OUTPATIENT)
Dept: OTHER | Facility: HOSPITAL | Age: 24
End: 2024-11-22
Attending: EMERGENCY MEDICINE

## (undated) NOTE — LETTER
August 8, 2018    Kristy Rothman  [id number] [id number]      Dear Alissa Farris: The following are the results of your recent tests. Please review the list of test results.   Your result is the value on the left; we have also supplied the range of .0 150.0 - 450.0 10(3)uL   MCV 92.6 81.0 - 100.0 fL   MCH 30.5 27.0 - 33.2 pg   MCHC 32.9 31.0 - 37.0 g/dL   RDW 11.9 11.5 - 16.0 %   RDW-SD 39.9 35.1 - 46.3 fL   Neutrophil Absolute Prelim 3.69 1.30 - 6.70 x10 (3) uL   Neutrophil Absolute 3.69 1.30